# Patient Record
Sex: FEMALE
[De-identification: names, ages, dates, MRNs, and addresses within clinical notes are randomized per-mention and may not be internally consistent; named-entity substitution may affect disease eponyms.]

---

## 2024-03-05 ENCOUNTER — RX ONLY (RX ONLY)
Age: 69
End: 2024-03-05

## 2024-03-15 ENCOUNTER — OFFICE (OUTPATIENT)
Age: 69
Setting detail: OPHTHALMOLOGY
End: 2024-03-15
Payer: MEDICARE

## 2024-03-15 DIAGNOSIS — Z96.1: ICD-10-CM

## 2024-03-15 PROCEDURE — 99024 POSTOP FOLLOW-UP VISIT: CPT | Performed by: OPTOMETRIST

## 2024-03-15 ASSESSMENT — REFRACTION_MANIFEST
OS_SPHERE: -1.00
OS_CYLINDER: -0.75
OS_AXIS: 102
OD_CYLINDER: -0.25
OD_AXIS: 101
OS_VA1: 20/40+2
OD_SPHERE: -0.75
OD_VA1: 20/30+2

## 2024-03-15 ASSESSMENT — SPHEQUIV_DERIVED
OS_SPHEQUIV: -1.375
OD_SPHEQUIV: -0.875

## 2025-06-07 ENCOUNTER — EMERGENCY (EMERGENCY)
Facility: HOSPITAL | Age: 70
LOS: 1 days | End: 2025-06-07
Attending: STUDENT IN AN ORGANIZED HEALTH CARE EDUCATION/TRAINING PROGRAM | Admitting: STUDENT IN AN ORGANIZED HEALTH CARE EDUCATION/TRAINING PROGRAM
Payer: MEDICARE

## 2025-06-07 VITALS
DIASTOLIC BLOOD PRESSURE: 73 MMHG | HEART RATE: 75 BPM | RESPIRATION RATE: 16 BRPM | TEMPERATURE: 98 F | SYSTOLIC BLOOD PRESSURE: 147 MMHG | OXYGEN SATURATION: 100 %

## 2025-06-07 VITALS
WEIGHT: 115.08 LBS | RESPIRATION RATE: 20 BRPM | OXYGEN SATURATION: 97 % | HEART RATE: 74 BPM | SYSTOLIC BLOOD PRESSURE: 170 MMHG | DIASTOLIC BLOOD PRESSURE: 91 MMHG | TEMPERATURE: 98 F

## 2025-06-07 LAB
ALBUMIN SERPL ELPH-MCNC: 4.1 G/DL — SIGNIFICANT CHANGE UP (ref 3.3–5)
ALP SERPL-CCNC: 66 U/L — SIGNIFICANT CHANGE UP (ref 40–120)
ALT FLD-CCNC: <5 U/L — SIGNIFICANT CHANGE UP (ref 4–33)
ANION GAP SERPL CALC-SCNC: 11 MMOL/L — SIGNIFICANT CHANGE UP (ref 7–14)
APTT BLD: 34.4 SEC — SIGNIFICANT CHANGE UP (ref 26.1–36.8)
AST SERPL-CCNC: 27 U/L — SIGNIFICANT CHANGE UP (ref 4–32)
BASOPHILS # BLD AUTO: 0.04 K/UL — SIGNIFICANT CHANGE UP (ref 0–0.2)
BASOPHILS NFR BLD AUTO: 1 % — SIGNIFICANT CHANGE UP (ref 0–2)
BILIRUB SERPL-MCNC: <0.2 MG/DL — SIGNIFICANT CHANGE UP (ref 0.2–1.2)
BUN SERPL-MCNC: 19 MG/DL — SIGNIFICANT CHANGE UP (ref 7–23)
CALCIUM SERPL-MCNC: 9 MG/DL — SIGNIFICANT CHANGE UP (ref 8.4–10.5)
CHLORIDE SERPL-SCNC: 100 MMOL/L — SIGNIFICANT CHANGE UP (ref 98–107)
CK MB BLD-MCNC: 2.1 % — SIGNIFICANT CHANGE UP (ref 0–2.5)
CK MB CFR SERPL CALC: 3 NG/ML — SIGNIFICANT CHANGE UP
CK SERPL-CCNC: 144 U/L — SIGNIFICANT CHANGE UP (ref 25–170)
CO2 SERPL-SCNC: 24 MMOL/L — SIGNIFICANT CHANGE UP (ref 22–31)
CREAT SERPL-MCNC: 0.76 MG/DL — SIGNIFICANT CHANGE UP (ref 0.5–1.3)
EGFR: 84 ML/MIN/1.73M2 — SIGNIFICANT CHANGE UP
EGFR: 84 ML/MIN/1.73M2 — SIGNIFICANT CHANGE UP
EOSINOPHIL # BLD AUTO: 0.23 K/UL — SIGNIFICANT CHANGE UP (ref 0–0.5)
EOSINOPHIL NFR BLD AUTO: 5.8 % — SIGNIFICANT CHANGE UP (ref 0–6)
GLUCOSE SERPL-MCNC: 96 MG/DL — SIGNIFICANT CHANGE UP (ref 70–99)
HCT VFR BLD CALC: 37.3 % — SIGNIFICANT CHANGE UP (ref 34.5–45)
HGB BLD-MCNC: 12.4 G/DL — SIGNIFICANT CHANGE UP (ref 11.5–15.5)
IANC: 1.7 K/UL — LOW (ref 1.8–7.4)
IMM GRANULOCYTES NFR BLD AUTO: 0.3 % — SIGNIFICANT CHANGE UP (ref 0–0.9)
INR BLD: <0.9 RATIO — SIGNIFICANT CHANGE UP (ref 0.85–1.16)
LYMPHOCYTES # BLD AUTO: 1.6 K/UL — SIGNIFICANT CHANGE UP (ref 1–3.3)
LYMPHOCYTES # BLD AUTO: 40.6 % — SIGNIFICANT CHANGE UP (ref 13–44)
MCHC RBC-ENTMCNC: 30.2 PG — SIGNIFICANT CHANGE UP (ref 27–34)
MCHC RBC-ENTMCNC: 33.2 G/DL — SIGNIFICANT CHANGE UP (ref 32–36)
MCV RBC AUTO: 91 FL — SIGNIFICANT CHANGE UP (ref 80–100)
MONOCYTES # BLD AUTO: 0.36 K/UL — SIGNIFICANT CHANGE UP (ref 0–0.9)
MONOCYTES NFR BLD AUTO: 9.1 % — SIGNIFICANT CHANGE UP (ref 2–14)
NEUTROPHILS # BLD AUTO: 1.7 K/UL — LOW (ref 1.8–7.4)
NEUTROPHILS NFR BLD AUTO: 43.2 % — SIGNIFICANT CHANGE UP (ref 43–77)
NRBC # BLD AUTO: 0 K/UL — SIGNIFICANT CHANGE UP (ref 0–0)
NRBC # FLD: 0 K/UL — SIGNIFICANT CHANGE UP (ref 0–0)
NRBC BLD AUTO-RTO: 0 /100 WBCS — SIGNIFICANT CHANGE UP (ref 0–0)
PLATELET # BLD AUTO: 210 K/UL — SIGNIFICANT CHANGE UP (ref 150–400)
POTASSIUM SERPL-MCNC: 3.7 MMOL/L — SIGNIFICANT CHANGE UP (ref 3.5–5.3)
POTASSIUM SERPL-SCNC: 3.7 MMOL/L — SIGNIFICANT CHANGE UP (ref 3.5–5.3)
PROT SERPL-MCNC: 6.9 G/DL — SIGNIFICANT CHANGE UP (ref 6–8.3)
PROTHROM AB SERPL-ACNC: 10.2 SEC — SIGNIFICANT CHANGE UP (ref 9.9–13.4)
RBC # BLD: 4.1 M/UL — SIGNIFICANT CHANGE UP (ref 3.8–5.2)
RBC # FLD: 13.2 % — SIGNIFICANT CHANGE UP (ref 10.3–14.5)
SODIUM SERPL-SCNC: 135 MMOL/L — SIGNIFICANT CHANGE UP (ref 135–145)
TROPONIN T, HIGH SENSITIVITY RESULT: 6 NG/L — SIGNIFICANT CHANGE UP
WBC # BLD: 3.94 K/UL — SIGNIFICANT CHANGE UP (ref 3.8–10.5)
WBC # FLD AUTO: 3.94 K/UL — SIGNIFICANT CHANGE UP (ref 3.8–10.5)

## 2025-06-07 PROCEDURE — 70498 CT ANGIOGRAPHY NECK: CPT | Mod: 26

## 2025-06-07 PROCEDURE — 70496 CT ANGIOGRAPHY HEAD: CPT | Mod: 26

## 2025-06-07 PROCEDURE — 0042T: CPT

## 2025-06-07 PROCEDURE — 93010 ELECTROCARDIOGRAM REPORT: CPT

## 2025-06-07 PROCEDURE — 70450 CT HEAD/BRAIN W/O DYE: CPT | Mod: 26,XU

## 2025-06-07 PROCEDURE — 99291 CRITICAL CARE FIRST HOUR: CPT

## 2025-06-07 RX ORDER — MECLIZINE HCL 12.5 MG
25 TABLET ORAL ONCE
Refills: 0 | Status: COMPLETED | OUTPATIENT
Start: 2025-06-07 | End: 2025-06-07

## 2025-06-07 RX ADMIN — Medication 25 MILLIGRAM(S): at 23:56

## 2025-06-07 NOTE — CONSULT NOTE ADULT - SUBJECTIVE AND OBJECTIVE BOX
Neurology - Consult Note    -  Spectra: [phone] ([facility]), [phone] ([facility])  -    HPI: 69 RH Female pmhx Parkinson's Disease, Asthma, presents as code stroke. LKW 19:45 on 6/7/25. Patient was in her usual state of health (has Parkinson's but no functional impairment, is independent) and eating dinner, when all of a sudden she developed vertigo and nausea. The vertigo fluctuates (was not present when pt supine on CT scanner), and it is worsened with head movement.  Allergies:  amoxicillin (Hives)  sulfa drugs (Angioedema)      PMHx/PSHx/Family Hx: As above, otherwise see below   History of Parkinson disease        Social Hx:  No current use of tobacco, alcohol, or illicit drugs  Lives with ***    Medications:  MEDICATIONS  (STANDING):    MEDICATIONS  (PRN):      Vitals:  T(C): 36.8 (06-07-25 @ 22:45), Max: 36.8 (06-07-25 @ 22:45)  HR: 74 (06-07-25 @ 22:45) (74 - 74)  BP: 170/91 (06-07-25 @ 22:45) (170/91 - 170/91)  RR: 20 (06-07-25 @ 22:45) (20 - 20)  SpO2: 97% (06-07-25 @ 22:45) (97% - 97%)    Physical Examination: INCOMPLETE  General - NAD  Cardiovascular - Peripheral pulses palpable, no edema  Eyes - Fundoscopy with flat, sharp optic discs and no hemorrhage or exudates; Fundoscopy not well visualized; Fundoscopy not performed due to safety precautions in the setting of the COVID-19 pandemic    Neurologic Exam:  Mental status - Awake, Alert, Oriented to person, place, and time. Speech fluent, repetition and naming intact. Follows simple and complex commands. Attention/concentration, recent and remote memory (including registration and recall), and fund of knowledge intact    Cranial nerves - PERRLA, VFF, EOMI, face sensation (V1-V3) intact b/l, facial strength intact without asymmetry b/l, hearing intact b/l, palate with symmetric elevation, trapezius OR sternocleidomastoid 5/5 strength b/l, tongue midline on protrusion with full lateral movement    Motor - Normal bulk and tone throughout. No pronator drift.  Strength testing            Deltoid      Biceps      Triceps     Wrist Extension    Wrist Flexion     Interossei         R            5                 5               5                     5                              5                        5                 5  L             5                 5               5                     5                              5                        5                 5              Hip Flexion    Hip Extension    Knee Flexion    Knee Extension    Dorsiflexion    Plantar Flexion  R              5                           5                       5                           5                            5                          5  L              5                           5                        5                           5                            5                          5    Sensation - Light touch/temperature OR pain/vibration intact throughout    DTR's -             Biceps      Triceps     Brachioradialis      Patellar    Ankle    Toes/plantar response  R             2+             2+                  2+                       2+            2+                 Down  L              2+             2+                 2+                        2+           2+                 Down    Coordination - Finger to Nose intact b/l. No tremors appreciated    Gait and station - Normal casual gait. Romberg (-)    Labs:                        12.4   3.94  )-----------( 210      ( 07 Jun 2025 23:17 )             37.3     06-07    135  |  100  |  x   ----------------------------<  x   3.7   |  24  |  x     Ca    9.0      07 Jun 2025 23:17    TPro  x   /  Alb  x   /  TBili  <0.2  /  DBili  x   /  AST  x   /  ALT  x   /  AlkPhos  x   06-07    CAPILLARY BLOOD GLUCOSE      POCT Blood Glucose.: 97 mg/dL (07 Jun 2025 22:44)          CSF:                  Radiology:     Neurology - Consult Note    -  Spectra: [phone] ([facility]), [phone] ([facility])  -    HPI: 69 RH Female pmhx Parkinson's Disease, Asthma, presents as code stroke. LKW 19:45 on 6/7/25. Patient was in her usual state of health (has Parkinson's but no functional impairment, is independent) and eating dinner, when all of a sudden she developed vertigo and nausea. The vertigo fluctuates (was not present when pt supine on CT scanner), and it is worsened with head movement in multiple directions. At time of neuro evaluation, pt was able to walk. Patient reports similar episode (that self-resolved) in 2024. Pt denies focal weakness,  blurry vision, double vision, tinnitus, hearing loss, LoC, involuntary movements, slurred speech, dysphagia. Patient did not report any subjective numbness, although RUE and RLE mild sensory deficit found on examination. No AC/AP at home.    Allergies:  amoxicillin (Hives)  sulfa drugs (Angioedema)      PMHx/PSHx/Family Hx: As above, otherwise see below   History of Parkinson disease        Social Hx:  No current use of tobacco, alcohol, or illicit drugs  Lives with ***    Medications:  MEDICATIONS  (STANDING):    MEDICATIONS  (PRN):      Vitals:  T(C): 36.8 (06-07-25 @ 22:45), Max: 36.8 (06-07-25 @ 22:45)  HR: 74 (06-07-25 @ 22:45) (74 - 74)  BP: 170/91 (06-07-25 @ 22:45) (170/91 - 170/91)  RR: 20 (06-07-25 @ 22:45) (20 - 20)  SpO2: 97% (06-07-25 @ 22:45) (97% - 97%)    Neuro Exam:  MS - AAOx3, speech fluent and clear with intact naming and repetition, follows complex commands, attention intact  CN - PERRL, VFF, EOMI, V1-V3 symmetric, no facial droop, hearing intact b/l, shoulder shrug symmetric, no tongue deviation  M - 5/5 b/l UE and LE throughout  S - mild decrease in sensation to LT in RUE and RLE  R - deferred i/s/o focused neuro exam  C - no limb ataxia on FTN or HTS  G - Pt able to ambulate independently with no stumbling or lateropulsion. Gait slightly cautious due to vertigo.    Labs:                        12.4   3.94  )-----------( 210      ( 07 Jun 2025 23:17 )             37.3     06-07    135  |  100  |  x   ----------------------------<  x   3.7   |  24  |  x     Ca    9.0      07 Jun 2025 23:17    TPro  x   /  Alb  x   /  TBili  <0.2  /  DBili  x   /  AST  x   /  ALT  x   /  AlkPhos  x   06-07    CAPILLARY BLOOD GLUCOSE      POCT Blood Glucose.: 97 mg/dL (07 Jun 2025 22:44)          CSF:                  Radiology:

## 2025-06-07 NOTE — ED ADULT TRIAGE NOTE - NSSEPSISSUSPECTED_ED_A_ED
Patient states she forgot to bring in her tramadol 50mg and tramadol 200mg for pill count today.   No

## 2025-06-07 NOTE — ED PROVIDER NOTE - PATIENT PORTAL LINK FT
You can access the FollowMyHealth Patient Portal offered by [facility] by registering at the following website: [email or web address]. By joining [employer]’s FollowMyHealth portal, you will also be able to view your health information using other applications (apps) compatible with our system.

## 2025-06-07 NOTE — ED PROVIDER NOTE - CLINICAL SUMMARY MEDICAL DECISION MAKING FREE TEXT BOX
Patient is a 69-year-old female past medical history Parkinson's disease, asthma, thyroidectomy, accompanied by daughter and  at bedside presents for chief complaint of sudden onset dizziness starting around 8 PM.  Patient was reportedly eating dinner and began to feel slightly dizzy and then it progressively worsened to the point where she was having difficulty ambulating.  She also has reported right leg numbness unclear when that started. Code Stroke was called, neurology team came and evaluated patient who had CTH without contrast without ICH, pending Angio/perfusion studies.

## 2025-06-07 NOTE — ED ADULT NURSE NOTE - OBJECTIVE STATEMENT
ULISES RN code stroke - Pt is a 69 year old F  with Hx of parkinson's, asthma, thyroidectomy.  Pt presented to ED c/o dizziness. patient endorses sudden onset dizziness starting @ 2000 w/ nausea no vomiting, patient LKW 7:45p. patient ambulated with steady gait and no dizziness. denies chest pain, SOB, headache, dizziness, abdominal pain, n/v/d, urinary symptoms, fevers/chills, numbness/tingling. Pt is A&Ox4, ambulatory with assistance. neuro intact/  Slight right sided sensory loss to right leg,. airway patent, speaking clearly in full sentences. breathing is even and unlabored spontaneous movement of all extremities. 20g RAC  IV placed, +blood return, flushes without difficulty. labs collected and sent. awaiting imaging. comfort measures provided. stretcher set in lowest position, call bell within reach, safety maintained.

## 2025-06-07 NOTE — CONSULT NOTE ADULT - ASSESSMENT
CHARLES 19:45 on 6/7/25  NIHSS X  preMRS 0  Pt  Pt    CTH  CTA  CTP    Impression: LKW 19:45 on 6/7/25  NIHSS 1 (+1 for RUE and RLE mild sensory deficit)  preMRS 0  Pt  Pt    CTH  CTA  CTP    Impression: CHARLES 19:45 on 6/7/25  NIHSS 1 (+1 for RUE and RLE mild sensory deficit)  preMRS 1 (Parksinon's symptoms, but no functional impairment  Pt  Pt not thrombectomy candidate because no LVO    CTH No acute intracranial pathology  CTA No LVO or flow limiting stenosis  CTP No perfusion deficit    Impression: Acute vestibular syndrome, fluctuating, worse positionally, exam with equivocal HINTS and no limb dysmetria, but found to have RUE and RLE sensory deficit on exam (although not reported symptomatically). Clinically suspecting peripheral over central.    Recommendations:  to be discussed LKW 19:45 on 6/7/25  NIHSS 1 (+1 for RUE and RLE mild sensory deficit)  preMRS 1 (Parksinon's symptoms, but no functional impairment  Pt not tenecteplase candidate because no disabling deficit  Pt not thrombectomy candidate because no LVO    CTH No acute intracranial pathology  CTA No LVO or flow limiting stenosis  CTP No perfusion deficit    Impression: Acute vestibular syndrome, fluctuating, worse positionally, exam with equivocal HINTS and no limb dysmetria, independent ambulation [was found to have RUE and RLE sensory deficit on exam but not reported symptomatically and unclear if this is new or old] Clinically suspecting peripheral etiology, lower suspicion for central etiology    Recommendations:  -Can trial meclizine for symptom relief. If refractory, consider Diazepam.  -No further inpatient neuro workup at this time. Patient should f/u outpatient with neurology, ENT, and vestibular therapy. If patient needs neurologist, can schedule appointment with [address] [place] [address] faculty practice [phone]    Case discussed with telestroke attending Dr. Veronika Leggett

## 2025-06-07 NOTE — ED PROVIDER NOTE - PROGRESS NOTE DETAILS
Salvatore Ken, DO (PGY-2) Neurology stating no concern for central vertigo, No further inpatient neuro workup at this time. Patient is ambulatory and currently asymptomatic back to baseline. Will send meclizine to pharmacy. Return precautions and care instructions discussed with patient and family at bedside. They endorsed understanding via teachback method.

## 2025-06-07 NOTE — ED PROVIDER NOTE - NSFOLLOWUPINSTRUCTIONS_ED_ALL_ED_FT
Please follow up with a neurologist, can schedule appointment with [address] [facility] faculty practice [phone]  Benign Paroxysmal Positional Vertigo    WHAT YOU NEED TO KNOW:    BPPV is an inner ear condition that causes you to suddenly feel dizzy. Benign means it is not serious or life-threatening. BPPV is caused by a problem with the nerves and structure of your inner ear. BPPV happens when small pieces of calcium break loose and lump together in one of your inner ear canals. Ear Anatomy         DISCHARGE INSTRUCTIONS:    Return to the emergency department if:     You fall during a BPPV episode and are injured.      You have a severe headache that does not go away.      You have new changes in your vision or feel weak or confused.      You have problems hearing, or you have ringing or buzzing in your ears.    Contact your healthcare provider if:     Your BPPV symptoms do not go away or they return.      You have problems with your balance, or you are falling often.      You have new or increased nausea or vomiting with vertigo.      You feel anxious or depressed and do not want to leave your home.      You have questions or concerns about your condition or care.    Medicines:     Medicines may be recommended or prescribed to treat dizziness or nausea.      Take your medicine as directed. Contact your healthcare provider if you think your medicine is not helping or if you have side effects. Tell him of her if you are allergic to any medicine. Keep a list of the medicines, vitamins, and herbs you take. Include the amounts, and when and why you take them. Bring the list or the pill bottles to follow-up visits. Carry your medicine list with you in case of an emergency.    Prevent your symptoms:     Try to avoid sudden head movements. Stand up and lie down slowly.       Raise and support your head when you lie down. Place pillows under your upper back and head or rest in a recliner.       Change your position often when you are lying down. Try not to lie with your head on the same side for long periods of time. Roll over slowly.       Wear protective gear when you ride a bike or play sports. A helmet helps protect your head from injury.    Follow up with your healthcare provider as directed: You may need to return in 1 month to check the progress of your treatment. Write down your questions so you remember to ask them during your visits.

## 2025-06-07 NOTE — ED PROVIDER NOTE - ATTENDING CONTRIBUTION TO CARE
69-year-old female with a past medical history of Parkinson's disease, asthma, thyroidectomy presents to the ED due to sudden onset dizziness at around 7:45 PM.  Patient reported was eating dinner just when it started and it progressively worsened over 15 minutes.  She was having difficulty ambulating.  Patient denies any weakness, numbness, chest pain shortness of breath, vision changes, hearing changes, back pain.    Physical exam:  Pleasant female in no acute distress, appears uncomfortable  Heart is regular rate and rhythm without murmurs or gallops  Lungs are clear to auscultation in all lung fields without wheezing rales rhonchi  Abdomen is soft, nontender, nondistended  EOMI, PERRLA, cranials 2-12 are intact.  Neuro: Numbness to the right upper and right lower extremity unknown if this is new or old.  Strength is 5 out of 5 to the bilateral upper and lower extremities.  Sensation intact to the left upper and left lower extremity.  Finger-to-nose heel-to-shin is intact.  Gait deferred for safety.    MDM:  Patient presents ED due to sudden onset dizziness.  Vital signs are within acceptable range.  Glucose is in the high 90s.  Given the patient's symptoms started less than 4 and half hours ago a stroke alert was activated, stroke neurologist at bedside.    CT imaging of the brain shows a negative CTA head neck, negative CT Noncon and negative CTP.    Blood work shows no anemia leukocytosis.  Normal renal function.  No electrolyte abnormalities.    My independent interpreted EKG shows normal sinus rhythm at 75 bpm, normal axis, normal intervals, nonspecific T wave changes, no STEMI.    Patient given meclizine.  On reassessment she states she felt better.  Patient was signed out to Dr. Zoila Cole pending neurology recommendation.

## 2025-06-07 NOTE — ED ADULT NURSE NOTE - CHIEF COMPLAINT QUOTE
Pt presents from home with sudden onset dizziness starting @ 2000 w/ nausea no vomiting. Pt denies headache, vision changes. Denies lightheadedness, chest pain or shortness of breath. Pt is ambulatory self but endorses severe dizziness when walking. Slight right sided sensory loss to right leg, code stroke activated. Phx parkinson's, asthma, thyroidectomy

## 2025-06-07 NOTE — ED ADULT TRIAGE NOTE - CHIEF COMPLAINT QUOTE
Pt presents from home with sudden onset dizziness starting @ 2000 w/ nausea no vomiting. Pt denies headache, vision changes, no neuro deficits noted. Denies lightheadedness, chest pain or shortness of breath. Pt is ambulatory self but endorses severe dizziness when walking. Phx parkinson's, asthma, thyroidectomy Pt presents from home with sudden onset dizziness starting @ 2000 w/ nausea no vomiting. Pt denies headache, vision changes. Denies lightheadedness, chest pain or shortness of breath. Pt is ambulatory self but endorses severe dizziness when walking. Slight right sided sensory loss to right leg, code stroke activated. Phx parkinson's, asthma, thyroidectomy

## 2025-06-07 NOTE — ED PROVIDER NOTE - PHYSICAL EXAMINATION
Pleasant female in no acute distress, appears uncomfortable  Heart is regular rate and rhythm without murmurs or gallops  Lungs are clear to auscultation in all lung fields without wheezing rales rhonchi  Abdomen is soft, nontender, nondistended  EOMI, PERRLA, cranial nerves 2-12 are intact.  Neuro: Numbness to the right upper and right lower extremity unknown if this is new or old.  Strength is 5 out of 5 to the bilateral upper and lower extremities.  Sensation intact to the left upper and left lower extremity.  Finger-to-nose heel-to-shin is intact.  Gait deferred for safety.

## 2025-06-08 RX ORDER — MECLIZINE HCL 12.5 MG
1 TABLET ORAL
Qty: 15 | Refills: 0
Start: 2025-06-08 | End: 2025-06-12